# Patient Record
(demographics unavailable — no encounter records)

---

## 2025-06-12 NOTE — PHYSICAL EXAM
[Chaperoned Physical Exam] : A chaperone was present in the examining room during all aspects of the physical examination. [Appropriately responsive] : appropriately responsive [Alert] : alert [No Acute Distress] : no acute distress [No Lymphadenopathy] : no lymphadenopathy [Regular Rate Rhythm] : regular rate rhythm [No Murmurs] : no murmurs [Clear to Auscultation B/L] : clear to auscultation bilaterally [Soft] : soft [Non-tender] : non-tender [Non-distended] : non-distended [No HSM] : No HSM [No Lesions] : no lesions [No Mass] : no mass [Oriented x3] : oriented x3 [Examination Of The Breasts] : a normal appearance [No Masses] : no breast masses were palpable [Vulvar Atrophy] : vulvar atrophy [Labia Minora] : normal [Labia Majora] : normal [Atrophy] : atrophy [Normal] : normal [Uterine Adnexae] : normal [FreeTextEntry2] : Cecille uLa (scribe)  [FreeTextEntry9] : Guaiac test negative, no masses noted

## 2025-06-12 NOTE — PLAN
Yes [FreeTextEntry1] : 66 year old female presents for routine gyn exam atrophy. PMH osteopenia, left para ovarian cyst, squamous cell carcinoma of the skin, aneurysm. SHX C/s x2, pilonidal cyst BSE taught Breast and pelvic exam performed  Rx given for mammogram and breast sonogram for 07/2025 (last 07/2024) Advised pt to schedule colonoscopy for due now (last 2020) Advised pt to schedule DEXA bone density for 03/2027 (last 03/2025)   Schedule Pelvic sono for 03/2026 (last 03/2025)  Vaginal Atrophy: Advised pt to continue lubricant with sex continue vagifem   RTO in 1 year or PRN  Cecille EID am scribing for and in the presence of ETHAN Nunes M.D. in the following sections: HISTORY OF PRESENT ILLNESS, PAST MEDICAL/FAMILY/SOCIAL HISTORY, REVIEW OF SYSTEMS, PHYSICAL EXAM, ASSESSMENT/PLAN.

## 2025-06-12 NOTE — HISTORY OF PRESENT ILLNESS
[FreeTextEntry1] : 2025. KRISTIE ANG 66 year old female  LMP PM presents for annual visit. PMH osteopenia, left para ovarian cyst, squamous cell carcinoma of the skin, aneurysm. SHX C/s x2, pilonidal cyst   She feels well and offers no complaints. She denies vaginal bleeding, abn discharge or vaginitis sxs. No urinary complaints. She has normal BM, no bloody stool. She denies abdominal or pelvic pain. She reports vaginal dryness that is managed with lubricant for intercourse.   OBHx:  - c/s x2 GynHx: osteopenia, left paraovarian cyst. No Hx of STI, fibroids, abnl paps, or pelvic infections. PMH: Reflux, melanoma, HTN, MVP, prolapse, squamous cell carcinoma of the skin, aneurysm  SHx: C/S x2, melanoma, pilonidal cyst FHx: Denies FHx of breast, ovarian, uterine or colon cancer. Med: vagifem, atorvastatin, ezetimibe, enalapril, amlodipine, aspirin, pantoprazole, finasteride  All: NKDA PHQ9=0 daughter grad college and starting grad school for PT. son is in grad school in [facility]  [TextBox_4] : 03/2025 Pelvic ultrasound - EML1.4 mm, no fluids, stable right ovary, stable left para ovarian cyst [Mammogramdate] : 07/2024 [BreastSonogramDate] : 07/2024 [BoneDensityDate] : 03/2025 [TextBox_37] : osteopenia, normal fraxs [ColonoscopyDate] : 2020